# Patient Record
Sex: FEMALE | Race: WHITE | HISPANIC OR LATINO | URBAN - METROPOLITAN AREA
[De-identification: names, ages, dates, MRNs, and addresses within clinical notes are randomized per-mention and may not be internally consistent; named-entity substitution may affect disease eponyms.]

---
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
## 2023-10-30 NOTE — ED PEDIATRIC TRIAGE NOTE - AS O2 DELIVERY
Requested Prescriptions     Pending Prescriptions Disp Refills    ondansetron (ZOFRAN ODT) 8 MG TABLET DISPERSIBLE 15 Tablet 3     Sig: DISSOLVE 1 TABLET IN MOUTH EVERY 12 HOURS AS NEEDED FOR NAUSEA FOR VOMITING      Last office visit: 6/29/23  Last lab: 3/20/23  
room air